# Patient Record
Sex: FEMALE | Race: WHITE | Employment: UNEMPLOYED | URBAN - METROPOLITAN AREA
[De-identification: names, ages, dates, MRNs, and addresses within clinical notes are randomized per-mention and may not be internally consistent; named-entity substitution may affect disease eponyms.]

---

## 2021-01-23 ENCOUNTER — OFFICE VISIT (OUTPATIENT)
Dept: INTERNAL MEDICINE CLINIC | Facility: CLINIC | Age: 31
End: 2021-01-23
Payer: COMMERCIAL

## 2021-01-23 VITALS
WEIGHT: 148 LBS | HEART RATE: 90 BPM | HEIGHT: 66 IN | OXYGEN SATURATION: 98 % | DIASTOLIC BLOOD PRESSURE: 68 MMHG | TEMPERATURE: 98 F | RESPIRATION RATE: 16 BRPM | SYSTOLIC BLOOD PRESSURE: 100 MMHG | BODY MASS INDEX: 23.78 KG/M2

## 2021-01-23 DIAGNOSIS — N93.8 DUB (DYSFUNCTIONAL UTERINE BLEEDING): Primary | ICD-10-CM

## 2021-01-23 PROCEDURE — 99203 OFFICE O/P NEW LOW 30 MIN: CPT | Performed by: PHYSICIAN ASSISTANT

## 2021-01-23 PROCEDURE — 3074F SYST BP LT 130 MM HG: CPT | Performed by: PHYSICIAN ASSISTANT

## 2021-01-23 PROCEDURE — 3008F BODY MASS INDEX DOCD: CPT | Performed by: PHYSICIAN ASSISTANT

## 2021-01-23 PROCEDURE — 3078F DIAST BP <80 MM HG: CPT | Performed by: PHYSICIAN ASSISTANT

## 2021-01-23 RX ORDER — NORETHINDRONE ACETATE AND ETHINYL ESTRADIOL .03; 1.5 MG/1; MG/1
1 TABLET ORAL DAILY
Qty: 1 PACKAGE | Refills: 2 | Status: SHIPPED | OUTPATIENT
Start: 2021-01-23 | End: 2021-02-20

## 2021-01-23 RX ORDER — SPIRONOLACTONE 50 MG/1
50 TABLET, FILM COATED ORAL DAILY
COMMUNITY

## 2021-01-23 NOTE — PROGRESS NOTES
HPI:    Patient ID: Diandra Roldan is a 35year old female. Menstrual Problem  The patient's primary symptoms include vaginal bleeding.  The patient's pertinent negatives include no genital itching, genital lesions, genital odor, genital rash, missed m by mouth daily. • Norethindrone Acet-Ethinyl Est (MICROGESTIN 1.5/30) 1.5-30 MG-MCG Oral Tab Take 1 tablet by mouth daily for 28 days.  1 Package 2     Allergies:  Amoxicillin             HIVES  Penicillin G            HIVES  Penicillins             HI 28 days.        Imaging & Referrals:  None       #[id number]

## 2021-01-23 NOTE — PATIENT INSTRUCTIONS
Start birth control pill once a day  Call if any side effects and please give us an update in 1 week on your bleeding

## 2021-03-06 ENCOUNTER — OFFICE VISIT (OUTPATIENT)
Dept: INTERNAL MEDICINE CLINIC | Facility: CLINIC | Age: 31
End: 2021-03-06
Payer: COMMERCIAL

## 2021-03-06 VITALS
RESPIRATION RATE: 16 BRPM | WEIGHT: 148 LBS | OXYGEN SATURATION: 99 % | HEART RATE: 74 BPM | TEMPERATURE: 99 F | SYSTOLIC BLOOD PRESSURE: 120 MMHG | DIASTOLIC BLOOD PRESSURE: 72 MMHG | HEIGHT: 66 IN | BODY MASS INDEX: 23.78 KG/M2

## 2021-03-06 DIAGNOSIS — N93.8 DUB (DYSFUNCTIONAL UTERINE BLEEDING): ICD-10-CM

## 2021-03-06 DIAGNOSIS — Z00.00 ANNUAL PHYSICAL EXAM: Primary | ICD-10-CM

## 2021-03-06 DIAGNOSIS — Z00.00 LABORATORY EXAMINATION ORDERED AS PART OF A ROUTINE GENERAL MEDICAL EXAMINATION: ICD-10-CM

## 2021-03-06 DIAGNOSIS — Z12.4 CERVICAL CANCER SCREENING: ICD-10-CM

## 2021-03-06 PROBLEM — L70.0 ACNE VULGARIS: Status: ACTIVE | Noted: 2021-03-06

## 2021-03-06 PROBLEM — N92.6 IRREGULAR MENSTRUAL BLEEDING: Status: ACTIVE | Noted: 2021-03-06

## 2021-03-06 PROCEDURE — 3074F SYST BP LT 130 MM HG: CPT | Performed by: NURSE PRACTITIONER

## 2021-03-06 PROCEDURE — 88175 CYTOPATH C/V AUTO FLUID REDO: CPT | Performed by: NURSE PRACTITIONER

## 2021-03-06 PROCEDURE — 99395 PREV VISIT EST AGE 18-39: CPT | Performed by: NURSE PRACTITIONER

## 2021-03-06 PROCEDURE — 3078F DIAST BP <80 MM HG: CPT | Performed by: NURSE PRACTITIONER

## 2021-03-06 PROCEDURE — 3008F BODY MASS INDEX DOCD: CPT | Performed by: NURSE PRACTITIONER

## 2021-03-06 RX ORDER — NORETHINDRONE ACETATE AND ETHINYL ESTRADIOL 1.5; 3 MG/1; UG/1
1 TABLET ORAL DAILY
Qty: 28 TABLET | Refills: 3 | Status: SHIPPED | OUTPATIENT
Start: 2021-03-06 | End: 2021-06-22

## 2021-03-06 RX ORDER — NORETHINDRONE ACETATE AND ETHINYL ESTRADIOL 1.5; 3 MG/1; UG/1
1 TABLET ORAL DAILY
COMMUNITY
Start: 2021-02-23 | End: 2021-03-06

## 2021-03-06 NOTE — PROGRESS NOTES
Wellness Exam    CC: Patient is presenting for a wellness exam    HPI:   Concerns: Irregular periods improving with OCP. She has not seen gyne previously for DUB. Has been on spironloactone for acne with significant improvement.  Has not had labs since star and rash. Neurological: Negative for tremors, weakness and numbness. Hematological: Negative for adenopathy. Does not bruise/bleed easily. Psychiatric/Behavioral: Negative for confusion and agitation. The patient is not nervous/anxious.       /7 Assessment and Plan:  Jennifer Garcia is a 35year old female here for a wellness exam.  Age appropriate cancer screening, labs, safety, immunizations were discussed with the patient and ordered as follows:    DUB- improved on BC.  Continue pill but re

## 2021-03-10 ENCOUNTER — TELEPHONE (OUTPATIENT)
Dept: INTERNAL MEDICINE CLINIC | Facility: CLINIC | Age: 31
End: 2021-03-10

## 2021-03-10 DIAGNOSIS — N76.0 BV (BACTERIAL VAGINOSIS): Primary | ICD-10-CM

## 2021-03-10 DIAGNOSIS — B96.89 BV (BACTERIAL VAGINOSIS): Primary | ICD-10-CM

## 2021-03-10 RX ORDER — METRONIDAZOLE 7.5 MG/G
1 GEL VAGINAL 2 TIMES DAILY
Qty: 70 G | Refills: 0 | Status: SHIPPED | OUTPATIENT
Start: 2021-03-10 | End: 2021-03-15

## 2021-03-10 NOTE — TELEPHONE ENCOUNTER
----- Message from PRADIP Man sent at 3/10/2021 10:43 AM CST -----  Results reviewed. Please inform patient pap smear is negative, repeat in 3 years.  + BV please order metronidazole gel 2.98% one full applicator (5 g) intravaginally once nightly

## 2021-03-10 NOTE — TELEPHONE ENCOUNTER
Patient expresses understanding of lab results and processes going forward. Orders placed for mediction.    Pt verbalizes  understanding of new medication and plan of care, advised to follow up if s/s worsen or unresolved

## 2021-03-12 DIAGNOSIS — N93.8 OTHER SPECIFIED ABNORMAL UTERINE AND VAGINAL BLEEDING: ICD-10-CM

## 2021-03-12 RX ORDER — NORETHINDRONE ACETATE AND ETHINYL ESTRADIOL 1.5; 3 MG/1; UG/1
TABLET ORAL
Qty: 21 TABLET | Refills: 2 | OUTPATIENT
Start: 2021-03-12

## 2021-04-06 ENCOUNTER — LAB ENCOUNTER (OUTPATIENT)
Dept: LAB | Facility: REFERENCE LAB | Age: 31
End: 2021-04-06
Attending: NURSE PRACTITIONER
Payer: COMMERCIAL

## 2021-04-06 DIAGNOSIS — Z00.00 LABORATORY EXAMINATION ORDERED AS PART OF A ROUTINE GENERAL MEDICAL EXAMINATION: ICD-10-CM

## 2021-04-06 PROCEDURE — 84443 ASSAY THYROID STIM HORMONE: CPT

## 2021-04-06 PROCEDURE — 82306 VITAMIN D 25 HYDROXY: CPT

## 2021-04-06 PROCEDURE — 80061 LIPID PANEL: CPT

## 2021-04-06 PROCEDURE — 80053 COMPREHEN METABOLIC PANEL: CPT

## 2021-04-06 PROCEDURE — 81001 URINALYSIS AUTO W/SCOPE: CPT

## 2021-04-06 PROCEDURE — 83036 HEMOGLOBIN GLYCOSYLATED A1C: CPT

## 2021-04-06 PROCEDURE — 36415 COLL VENOUS BLD VENIPUNCTURE: CPT

## 2021-04-06 PROCEDURE — 85025 COMPLETE CBC W/AUTO DIFF WBC: CPT

## 2021-06-15 ENCOUNTER — TELEPHONE (OUTPATIENT)
Dept: INTERNAL MEDICINE CLINIC | Facility: CLINIC | Age: 31
End: 2021-06-15

## 2021-06-22 DIAGNOSIS — N93.8 OTHER SPECIFIED ABNORMAL UTERINE AND VAGINAL BLEEDING: Primary | ICD-10-CM

## 2021-06-22 RX ORDER — NORETHINDRONE ACETATE AND ETHINYL ESTRADIOL AND FERROUS FUMARATE 1.5-30(21)
KIT ORAL
Qty: 84 TABLET | Refills: 1 | Status: SHIPPED | OUTPATIENT
Start: 2021-06-22 | End: 2021-12-12

## 2021-06-22 NOTE — TELEPHONE ENCOUNTER
Last time medication was refilled 4/4/21  Quantity and # of refills 84 w/ 0 refill  Last OV 3/6/21  Next OV physical due 3/6/22

## 2021-07-13 ENCOUNTER — HOSPITAL ENCOUNTER (OUTPATIENT)
Age: 31
Discharge: HOME | End: 2021-07-13
Payer: COMMERCIAL

## 2021-07-13 VITALS
DIASTOLIC BLOOD PRESSURE: 84 MMHG | OXYGEN SATURATION: 100 % | SYSTOLIC BLOOD PRESSURE: 126 MMHG | RESPIRATION RATE: 18 BRPM | TEMPERATURE: 99 F | HEART RATE: 83 BPM

## 2021-07-13 DIAGNOSIS — L03.116 CELLULITIS OF LEFT LOWER EXTREMITY: Primary | ICD-10-CM

## 2021-07-13 PROCEDURE — 99213 OFFICE O/P EST LOW 20 MIN: CPT

## 2021-07-13 PROCEDURE — 99203 OFFICE O/P NEW LOW 30 MIN: CPT

## 2021-07-13 RX ORDER — CEPHALEXIN 500 MG/1
500 CAPSULE ORAL 4 TIMES DAILY
Qty: 40 CAPSULE | Refills: 0 | Status: SHIPPED | OUTPATIENT
Start: 2021-07-13 | End: 2021-07-23

## 2021-07-14 NOTE — ED PROVIDER NOTES
Patient Seen in: [facility]      History   Patient presents with:  Bite Sting,Insect    Stated Complaint: Insect Bite    HPI/Subjective:   Silas Balwdin is a 32-year-old female presenting with redness to left inner thigh since yesterday. (Temporal)   Resp 18   LMP 06/27/2021   SpO2 100%         Physical Exam  Vitals and nursing note reviewed. Constitutional:       General: She is not in acute distress. Appearance: Normal appearance. She is not ill-appearing or toxic-appearing.    MARCY female presenting with redness/warmth to left thigh. HPI and physical exam as noted above. Findings consistent with cellulitis. Will give RX Cephalexin.  Erythema was outlined with skin marker and patient advised to return or go to the ED if erythema extend

## 2021-07-14 NOTE — ED INITIAL ASSESSMENT (HPI)
Patient believes she sustained an insect bite to her left posterior thigh yesterday. States she noted a raised lesion to the site and attempted to \"pop it\" yesterday evening. Denies drainage. Now with redness warmth and tenderness around the site.

## 2021-12-12 DIAGNOSIS — N93.8 OTHER SPECIFIED ABNORMAL UTERINE AND VAGINAL BLEEDING: ICD-10-CM

## 2021-12-12 RX ORDER — NORETHINDRONE ACETATE AND ETHINYL ESTRADIOL AND FERROUS FUMARATE 1.5-30(21)
KIT ORAL
Qty: 84 TABLET | Refills: 1 | Status: SHIPPED | OUTPATIENT
Start: 2021-12-12

## 2021-12-16 ENCOUNTER — HOSPITAL ENCOUNTER (OUTPATIENT)
Age: 31
Discharge: HOME | End: 2021-12-16
Payer: COMMERCIAL

## 2021-12-16 VITALS
SYSTOLIC BLOOD PRESSURE: 132 MMHG | HEART RATE: 86 BPM | TEMPERATURE: 98 F | RESPIRATION RATE: 18 BRPM | DIASTOLIC BLOOD PRESSURE: 69 MMHG | OXYGEN SATURATION: 100 %

## 2021-12-16 DIAGNOSIS — Z20.822 EXPOSURE TO COVID-19 VIRUS: Primary | ICD-10-CM

## 2021-12-16 DIAGNOSIS — Z20.822 LAB TEST NEGATIVE FOR COVID-19 VIRUS: ICD-10-CM

## 2021-12-16 PROCEDURE — 99212 OFFICE O/P EST SF 10 MIN: CPT

## 2022-12-29 ENCOUNTER — APPOINTMENT (OUTPATIENT)
Dept: URGENT CARE | Age: 32
End: 2022-12-29

## 2025-05-27 NOTE — ED PROVIDER NOTES
Patient Seen in: [facility]      History   Patient presents with:  Covid-19 Test    Stated Complaint: COVID TEST    Subjective:   HPI    This is a 28-year-old female presenting for Covid testing after exposure no symptoms or complaints fully 42-year-old female well-appearing nontoxic presenting for Covid testing after exposure rapid Covid collected and negative patient made aware of results.                          Disposition and Plan     Clinical Impression:  Exposure to COVID-19 virus on the discharge service for the patient. I have reviewed and made amendments to the documentation where necessary.